# Patient Record
Sex: FEMALE | Employment: FULL TIME | URBAN - METROPOLITAN AREA
[De-identification: names, ages, dates, MRNs, and addresses within clinical notes are randomized per-mention and may not be internally consistent; named-entity substitution may affect disease eponyms.]

---

## 2018-06-12 ENCOUNTER — HOSPITAL ENCOUNTER (OUTPATIENT)
Dept: CT IMAGING | Age: 48
Discharge: HOME | End: 2018-06-12
Attending: FAMILY MEDICINE
Payer: COMMERCIAL

## 2018-06-12 DIAGNOSIS — R59.0 ENLARGED LYMPH NODE IN NECK: ICD-10-CM

## 2018-06-12 PROCEDURE — 74011000258 HC RX REV CODE- 258: Performed by: FAMILY MEDICINE

## 2018-06-12 PROCEDURE — 70491 CT SOFT TISSUE NECK W/DYE: CPT

## 2018-06-12 PROCEDURE — 74011636320 HC RX REV CODE- 636/320: Performed by: FAMILY MEDICINE

## 2018-06-12 RX ORDER — SODIUM CHLORIDE 0.9 % (FLUSH) 0.9 %
10 SYRINGE (ML) INJECTION
Status: COMPLETED | OUTPATIENT
Start: 2018-06-12 | End: 2018-06-12

## 2018-06-12 RX ADMIN — SODIUM CHLORIDE 100 ML: 900 INJECTION, SOLUTION INTRAVENOUS at 08:05

## 2018-06-12 RX ADMIN — Medication 10 ML: at 08:05

## 2018-06-12 RX ADMIN — IOPAMIDOL: 755 INJECTION, SOLUTION INTRAVENOUS at 08:05

## 2018-06-12 NOTE — PROGRESS NOTES
present for CT scan with contrast        Hermilo [email or web address]  c: [address]  [facility] / [place], [address] [facility]  [email or web address]

## 2018-06-14 NOTE — PROGRESS NOTES
Please let patient know her CT neck is normal. IMPRESSION:  No acute findings in the neck    Thanks,  Dr. Ayleen Olvera

## 2018-10-19 ENCOUNTER — HOSPITAL ENCOUNTER (OUTPATIENT)
Dept: MAMMOGRAPHY | Age: 48
Discharge: HOME | End: 2018-10-19
Attending: FAMILY MEDICINE
Payer: COMMERCIAL

## 2018-10-19 DIAGNOSIS — Z12.31 SCREENING MAMMOGRAM, ENCOUNTER FOR: ICD-10-CM

## 2018-10-19 PROCEDURE — 77067 SCR MAMMO BI INCL CAD: CPT

## 2019-03-12 ENCOUNTER — HOSPITAL ENCOUNTER (OUTPATIENT)
Dept: MAMMOGRAPHY | Age: 49
Discharge: HOME | End: 2019-03-12
Attending: FAMILY MEDICINE
Payer: COMMERCIAL

## 2019-03-12 DIAGNOSIS — N64.4 BREAST PAIN: ICD-10-CM

## 2019-03-12 PROCEDURE — 77066 DX MAMMO INCL CAD BI: CPT

## 2019-03-12 NOTE — PROGRESS NOTES
Dear Mrs. Jeffers Redo     Your recent mammogram showed :No mammographic evidence of malignancy.      Recommend annual mammogram in one year.  A reminder letter will be scheduled.     Thanks,  Dr. James Hallmark

## 2020-03-17 PROBLEM — E78.1 HYPERTRIGLYCERIDEMIA: Status: ACTIVE | Noted: 2020-03-17

## 2020-06-09 ENCOUNTER — HOSPITAL ENCOUNTER (OUTPATIENT)
Dept: MAMMOGRAPHY | Age: 50
Discharge: HOME | End: 2020-06-09
Attending: FAMILY MEDICINE
Payer: COMMERCIAL

## 2020-06-09 DIAGNOSIS — Z12.39 BREAST CANCER SCREENING: ICD-10-CM

## 2020-06-09 PROCEDURE — 77067 SCR MAMMO BI INCL CAD: CPT

## 2020-06-21 NOTE — PROGRESS NOTES
Dear Doni Koffi Jameson     Your recent mammogram showed :No mammographic evidence of malignancy.      Recommend annual mammogram in one year.  A reminder letter will be scheduled.     Thanks,  Dr. Thais Du

## 2021-08-04 ENCOUNTER — HOSPITAL ENCOUNTER (OUTPATIENT)
Dept: LAB | Age: 51
Discharge: HOME | End: 2021-08-04
Payer: COMMERCIAL

## 2021-08-04 DIAGNOSIS — D47.2 MGUS (MONOCLONAL GAMMOPATHY OF UNKNOWN SIGNIFICANCE): ICD-10-CM

## 2021-08-04 LAB
ALBUMIN SERPL-MCNC: 4.2 G/DL (ref 3.5–5)
ALBUMIN/GLOB SERPL: 1 {RATIO} (ref 1.2–3.5)
ALP SERPL-CCNC: 69 U/L (ref 50–136)
ALT SERPL-CCNC: 79 U/L (ref 12–65)
ANION GAP SERPL CALC-SCNC: 3 MMOL/L (ref 7–16)
AST SERPL-CCNC: 32 U/L (ref 15–37)
BASOPHILS # BLD: 0 K/UL (ref 0–0.2)
BASOPHILS NFR BLD: 0 % (ref 0–2)
BILIRUB SERPL-MCNC: 0.5 MG/DL (ref 0.2–1.1)
BUN SERPL-MCNC: 15 MG/DL (ref 6–23)
CALCIUM SERPL-MCNC: 9.2 MG/DL (ref 8.3–10.4)
CHLORIDE SERPL-SCNC: 104 MMOL/L (ref 98–107)
CO2 SERPL-SCNC: 30 MMOL/L (ref 21–32)
CREAT SERPL-MCNC: 0.8 MG/DL (ref 0.6–1)
CRP SERPL-MCNC: <0.3 MG/DL (ref 0–0.9)
DIFFERENTIAL METHOD BLD: NORMAL
EOSINOPHIL # BLD: 0.2 K/UL (ref 0–0.8)
EOSINOPHIL NFR BLD: 3 % (ref 0.5–7.8)
ERYTHROCYTE [DISTWIDTH] IN BLOOD BY AUTOMATED COUNT: 12.6 % (ref 11.9–14.6)
ERYTHROCYTE [SEDIMENTATION RATE] IN BLOOD: 10 MM/HR (ref 0–30)
GLOBULIN SER CALC-MCNC: 4.3 G/DL (ref 2.3–3.5)
GLUCOSE SERPL-MCNC: 95 MG/DL (ref 65–100)
HCT VFR BLD AUTO: 44.5 % (ref 35.8–46.3)
HGB BLD-MCNC: 14.8 G/DL (ref 11.7–15.4)
IMM GRANULOCYTES # BLD AUTO: 0 K/UL (ref 0–0.5)
IMM GRANULOCYTES NFR BLD AUTO: 0 % (ref 0–5)
LYMPHOCYTES # BLD: 2.2 K/UL (ref 0.5–4.6)
LYMPHOCYTES NFR BLD: 42 % (ref 13–44)
MCH RBC QN AUTO: 30.6 PG (ref 26.1–32.9)
MCHC RBC AUTO-ENTMCNC: 33.3 G/DL (ref 31.4–35)
MCV RBC AUTO: 92.1 FL (ref 79.6–97.8)
MONOCYTES # BLD: 0.5 K/UL (ref 0.1–1.3)
MONOCYTES NFR BLD: 9 % (ref 4–12)
NEUTS SEG # BLD: 2.4 K/UL (ref 1.7–8.2)
NEUTS SEG NFR BLD: 45 % (ref 43–78)
NRBC # BLD: 0 K/UL (ref 0–0.2)
PLATELET # BLD AUTO: 198 K/UL (ref 150–450)
PMV BLD AUTO: 9.5 FL (ref 9.4–12.3)
POTASSIUM SERPL-SCNC: 4.1 MMOL/L (ref 3.5–5.1)
PROT SERPL-MCNC: 8.5 G/DL (ref 6.3–8.2)
RBC # BLD AUTO: 4.83 M/UL (ref 4.05–5.2)
SODIUM SERPL-SCNC: 137 MMOL/L (ref 136–145)
WBC # BLD AUTO: 5.3 K/UL (ref 4.3–11.1)

## 2021-08-04 PROCEDURE — 85652 RBC SED RATE AUTOMATED: CPT

## 2021-08-04 PROCEDURE — 85025 COMPLETE CBC W/AUTO DIFF WBC: CPT

## 2021-08-04 PROCEDURE — 86140 C-REACTIVE PROTEIN: CPT

## 2021-08-04 PROCEDURE — 83883 ASSAY NEPHELOMETRY NOT SPEC: CPT

## 2021-08-04 PROCEDURE — 36415 COLL VENOUS BLD VENIPUNCTURE: CPT

## 2021-08-04 PROCEDURE — 82784 ASSAY IGA/IGD/IGG/IGM EACH: CPT

## 2021-08-04 PROCEDURE — 80053 COMPREHEN METABOLIC PANEL: CPT

## 2021-08-04 PROCEDURE — 86334 IMMUNOFIX E-PHORESIS SERUM: CPT

## 2021-08-05 LAB
KAPPA LC FREE SER-MCNC: 21.95 MG/L (ref 3.3–19.4)
KAPPA LC FREE/LAMBDA FREE SER: 1.74 {RATIO} (ref 0.26–1.65)
LAMBDA LC FREE SERPL-MCNC: 12.63 MG/L (ref 5.71–26.3)

## 2021-08-06 ENCOUNTER — HOSPITAL ENCOUNTER (OUTPATIENT)
Dept: LAB | Age: 51
Discharge: HOME | End: 2021-08-06
Payer: COMMERCIAL

## 2021-08-06 DIAGNOSIS — D47.2 MGUS (MONOCLONAL GAMMOPATHY OF UNKNOWN SIGNIFICANCE): ICD-10-CM

## 2021-08-06 LAB
ALBUMIN SERPL ELPH-MCNC: 4.23 G/DL (ref 3.2–5.6)
ALBUMIN/GLOB SERPL: 1.1 {RATIO}
ALPHA1 GLOB SERPL ELPH-MCNC: 0.21 G/DL (ref 0.1–0.4)
ALPHA2 GLOB SERPL ELPH-MCNC: 0.69 G/DL (ref 0.4–1.2)
B-GLOBULIN SERPL QL ELPH: 1.23 G/DL (ref 0.6–1.3)
GAMMA GLOB MFR SERPL ELPH: 1.73 G/DL (ref 0.5–1.6)
IGA SERPL-MCNC: 484 MG/DL (ref 85–499)
IGG SERPL-MCNC: 1462 MG/DL (ref 610–1616)
IGM SERPL-MCNC: 109 MG/DL (ref 35–242)
M PROTEIN SERPL ELPH-MCNC: ABNORMAL G/DL
PROT PATTERN SERPL ELPH-IMP: ABNORMAL
PROT PATTERN SPEC IFE-IMP: ABNORMAL
PROT SERPL-MCNC: 8.1 G/DL (ref 6.3–8.2)

## 2021-08-06 PROCEDURE — 86335 IMMUNFIX E-PHORSIS/URINE/CSF: CPT

## 2021-08-06 PROCEDURE — 84166 PROTEIN E-PHORESIS/URINE/CSF: CPT

## 2021-08-10 LAB
ALBUMIN UR ELPH-MCNC: <2 MG/DL
ALPHA1 GLOB 24H UR ELPH-MCNC: 0.1 MG/DL
ALPHA2 GLOB SERPL ELPH-MCNC: <0.4 MG/DL
B-GLOBULIN UR QL ELPH: <1.3 MG/DL
COLLECT DURATION TIME UR: 24 HR
GAMMA GLOB MFR UR ELPH: <1.2 MG/DL
M PROTEIN UR-MCNC: NORMAL MG/DL
PROT 24H UR-MRATE: NORMAL MG/24HR
PROT PATTERN SPEC IFE-IMP: NORMAL
PROT PATTERN UR ELPH-IMP: NORMAL
PROT UR-MCNC: <5 MG/DL
SPECIMEN VOL ?TM UR: 2075 ML

## 2022-10-19 ENCOUNTER — OFFICE VISIT (OUTPATIENT)
Dept: OBGYN CLINIC | Age: 52
End: 2022-10-19
Payer: COMMERCIAL

## 2022-10-19 VITALS
DIASTOLIC BLOOD PRESSURE: 68 MMHG | WEIGHT: 166 LBS | HEIGHT: 62 IN | SYSTOLIC BLOOD PRESSURE: 104 MMHG | BODY MASS INDEX: 30.55 KG/M2

## 2022-10-19 DIAGNOSIS — Z13.89 SCREENING FOR GENITOURINARY CONDITION: ICD-10-CM

## 2022-10-19 DIAGNOSIS — Z12.4 PAP SMEAR FOR CERVICAL CANCER SCREENING: ICD-10-CM

## 2022-10-19 DIAGNOSIS — Z01.419 WELL WOMAN EXAM: Primary | ICD-10-CM

## 2022-10-19 DIAGNOSIS — Z11.51 SCREENING FOR HUMAN PAPILLOMAVIRUS (HPV): ICD-10-CM

## 2022-10-19 LAB
BILIRUBIN, URINE, POC: NEGATIVE
BLOOD URINE, POC: NEGATIVE
GLUCOSE URINE, POC: NEGATIVE
KETONES, URINE, POC: NEGATIVE
LEUKOCYTE ESTERASE, URINE, POC: NEGATIVE
NITRITE, URINE, POC: NEGATIVE
PH, URINE, POC: 5.5 (ref 4.6–8)
PROTEIN,URINE, POC: NEGATIVE
SPECIFIC GRAVITY, URINE, POC: 1.02 (ref 1–1.03)
URINALYSIS CLARITY, POC: CLEAR
URINALYSIS COLOR, POC: YELLOW
UROBILINOGEN, POC: NORMAL

## 2022-10-19 PROCEDURE — 99396 PREV VISIT EST AGE 40-64: CPT | Performed by: OBSTETRICS & GYNECOLOGY

## 2022-10-19 PROCEDURE — 81002 URINALYSIS NONAUTO W/O SCOPE: CPT | Performed by: OBSTETRICS & GYNECOLOGY

## 2022-10-19 NOTE — PROGRESS NOTES
HPI: Ms. Ildefonso Biswas is a 45 y.o.   OB History          3    Para        Term   3            AB        Living   3         SAB        IAB        Ectopic        Molar        Multiple        Live Births                 who is here today for a well woman exam. C/o vague lower abdominal pain. No constipation or diarrhea, no fever/chills  No menses since February. Date Performed Result   PAP 20 Negative HR HPV negative   Mammogram 22 Benign   Colonoscopy 11/3/21 Polyps   Dexa NA      GYN History         Patient's last menstrual period was 2022 (approximate). Past Medical History:  Past Medical History:   Diagnosis Date    Adverse effect of anesthesia     hypotension after cosmetic surgery due to length of surgery- lipo, abdominoplasty and breast augmentation- [place]    Alopecia     Gastroesophageal reflux disease without esophagitis 2016    GERD (gastroesophageal reflux disease)     well controlled with omeprazole    Hyperlipidemia LDL goal <100 2016    Hypertriglyceridemia 3/17/2020    Hypothyroid     levothothyroxen     Irritable bowel syndrome without diarrhea 2016    Slow transit constipation 2016    Vitamin D deficiency 2016       Past Surgical History:  Past Surgical History:   Procedure Laterality Date    ABDOMINOPLASTY  2014    BREAST SURGERY  ; 2016    HEENT Left     eye surgery     IMPLANT BREAST SILICONE/EQ Bilateral 2730    LIPOSUCTION      ORTHOPEDIC SURGERY  10/21/2016    left knee       Allergies:    Allergies   Allergen Reactions    Sulfa Antibiotics Hives       Medication History:  Current Outpatient Medications   Medication Sig Dispense Refill    atorvastatin (LIPITOR) 10 MG tablet Take 10 mg by mouth daily      ergocalciferol (ERGOCALCIFEROL) 1.25 MG (78595 UT) capsule Take 50,000 Units by mouth every 7 days      ferrous sulfate (IRON 325) 325 (65 Fe) MG tablet TAKE 1 TABLET BY MOUTH THREE TIMES A DAY levocetirizine (XYZAL) 5 MG tablet Take 5 mg by mouth daily      levothyroxine (SYNTHROID) 25 MCG tablet Takes 1 1/2 PO Daily= 37.5 mcg Indications: a condition with low thyroid hormone levels      linaclotide (LINZESS) 290 MCG CAPS capsule Take 290 mcg by mouth daily      meclizine (ANTIVERT) 12.5 MG tablet Take 12.5 mg by mouth 3 times daily as needed      omeprazole (PRILOSEC) 40 MG delayed release capsule Take 40 mg by mouth daily       No current facility-administered medications for this visit. Social History:  Social History     Socioeconomic History    Marital status:      Spouse name: Not on file    Number of children: Not on file    Years of education: Not on file    Highest education level: Not on file   Occupational History    Not on file   Tobacco Use    Smoking status: Former     Types: Cigarettes     Quit date: 1998     Years since quittin.8    Smokeless tobacco: Never   Substance and Sexual Activity    Alcohol use: No    Drug use: No    Sexual activity: Not on file   Other Topics Concern    Not on file   Social History Narrative    Not on file     Social Determinants of Health     Financial Resource Strain: Not on file   Food Insecurity: Not on file   Transportation Needs: Not on file   Physical Activity: Not on file   Stress: Not on file   Social Connections: Not on file   Intimate Partner Violence: Not on file   Housing Stability: Not on file       Family History:  Family History   Problem Relation Age of Onset    Cancer Mother         pancreas and liver    Asthma Father     Breast Cancer Neg Hx     Hypertension Mother        Review of Systems - General ROS: negative except for that discussed in HPI      ROS:  Feeling well. No dyspnea or chest pain on exertion. No abdominal pain, change in bowel habits, black or bloody stools. No urinary tract symptoms. No neurological complaints.     Objective:   /68   Ht 5' 2\" (1.575 m)   Wt 166 lb (75.3 kg)   LMP 2022 (Approximate)   BMI 30.36 kg/m²     Results for orders placed or performed in visit on 10/19/22   AMB POC URINALYSIS DIP STICK MANUAL W/O MICRO   Result Value Ref Range    Color (UA POC) Yellow     Clarity (UA POC) Clear     Glucose, Urine, POC Negative Negative    Bilirubin, Urine, POC Negative Negative    Ketones, Urine, POC Negative Negative    Specific Gravity, Urine, POC 1.020 1.001 - 1.035    Blood (UA POC) Negative Negative    pH, Urine, POC 5.5 4.6 - 8.0    Protein, Urine, POC Negative Negative    Urobilinogen, POC 0.2 mg/dL     Nitrite, Urine, POC Negative Negative    Leukocyte Esterase, Urine, POC Negative Negative        The patient appears well, alert, oriented x 3, in no distress. ENT normal.  Neck supple. No adenopathy or thyromegaly. Lungs:  clear, good air entry, no wheezes, rhonchi or rales. Heart:  S1 and S2 normal, no murmurs, regular rate and rhythm. Abdomen:  soft without tenderness, guarding, mass or organomegaly. Extremities show no edema, normal peripheral pulses. Neurological is normal, no focal findings. BREAST EXAM: breasts appear normal, no suspicious masses, no skin or nipple changes or axillary nodes, symmetric fibrous changes bilaterally    PELVIC EXAM: External genitalia is within normal limits, urethra, urethra meatus and bladder are midline well supported. Vagina is well rugated, Cervix comes into full view and is within normal limits. Pap done Uterus is 6  week size, no ovarian masses palpated    Assessment/Plan:      Diagnosis Orders   1. Well woman exam  AMB POC URINALYSIS DIP STICK MANUAL W/O MICRO    PAP IG, Aptima HPV and rfx 16/18,45 ([id number])    PAP IG, Aptima HPV and rfx 16/18,45 ([id number])      2. Screening for genitourinary condition  AMB POC URINALYSIS DIP STICK MANUAL W/O MICRO      3. Pap smear for cervical cancer screening  PAP IG, Aptima HPV and rfx 16/18,45 ([id number])    PAP IG, Aptima HPV and rfx 16/18,45 ([id number])      4.  Screening for human papillomavirus (HPV)  PAP IG, Aptima HPV and rfx 16/18,45 ([id number])    PAP IG, Aptima HPV and rfx 16/18,45 ([id number])        Encounter Diagnoses   Name Primary? Well woman exam Yes    Screening for genitourinary condition     Pap smear for cervical cancer screening     Screening for human papillomavirus (HPV)      Orders Placed This Encounter   Procedures    PAP IG, Aptima HPV and rfx 16/18,45 ([id number])     Standing Status:   Future     Number of Occurrences:   1     Standing Expiration Date:   10/19/2023     Order Specific Question:   Pap Source? (Required)     Answer:   cervical     Order Specific Question:   Pap Source? (Required)     Answer:   endocervical     Order Specific Question:   Pap Source? (Required)     Answer:   endometrial     Order Specific Question:   Pap collection method? (Required     Answer:   brush     Order Specific Question:   Pap collection method? (Required     Answer:   broom     Order Specific Question:   Menstrual Status ? Answer:   Postmenopausal [2]     Order Specific Question:   Previous Treatment? (Required)     Answer:   NONE    AMB POC URINALYSIS DIP STICK MANUAL W/O MICRO     Chidi Emperor was seen today for annual exam.    Diagnoses and all orders for this visit:    Well woman exam  -     AMB POC URINALYSIS DIP STICK MANUAL W/O MICRO  -     PAP IG, Aptima HPV and rfx 16/18,45 ([id number]); Future  -     PAP IG, Aptima HPV and rfx 16/18,45 ([id number])    Screening for genitourinary condition  -     AMB POC URINALYSIS DIP STICK MANUAL W/O MICRO    Pap smear for cervical cancer screening  -     PAP IG, Aptima HPV and rfx 16/18,45 ([id number]); Future  -     PAP IG, Aptima HPV and rfx 16/18,45 ([id number])    Screening for human papillomavirus (HPV)  -     PAP IG, Aptima HPV and rfx 16/18,45 ([id number]); Future  -     PAP IG, Aptima HPV and rfx 16/18,45 ([id number])      Return in about 2 weeks (around 11/2/2022) for yearly physical, gyn U/S, recheck.   current treatment plan is effective, no change in therapy   present on-line entire time of exam and discussion; all ?s answered  Mammogram current  pap smear done  return 2 weeks for pelvic U/S

## 2022-10-26 LAB
CYTOLOGIST CVX/VAG CYTO: ABNORMAL
CYTOLOGY CVX/VAG DOC THIN PREP: ABNORMAL
HPV APTIMA: NEGATIVE
Lab: ABNORMAL
PATH REPORT.FINAL DX SPEC: ABNORMAL
PATHOLOGIST CVX/VAG CYTO: ABNORMAL
PATHOLOGIST PROVIDED ICD: ABNORMAL
RECOM F/U CVX/VAG CYTO: ABNORMAL
STAT OF ADQ CVX/VAG CYTO-IMP: ABNORMAL

## 2022-11-04 ENCOUNTER — TELEPHONE (OUTPATIENT)
Dept: OBGYN CLINIC | Age: 52
End: 2022-11-04

## 2022-11-04 NOTE — TELEPHONE ENCOUNTER
----- Message from Bianca Perdomo MD sent at 11/2/2022  4:10 PM EDT -----  Regarding: pap  ASCUS pos, but HPV negative; so repeat 1 year  ----- Message -----  From: Nickie Martins Incoming [place] [facility]  Sent: 10/26/2022  11:38 AM EDT  To: Bianca Perdomo MD

## 2022-11-10 ENCOUNTER — TELEPHONE (OUTPATIENT)
Dept: OBGYN CLINIC | Age: 52
End: 2022-11-10

## 2022-11-10 ENCOUNTER — OFFICE VISIT (OUTPATIENT)
Dept: OBGYN CLINIC | Age: 52
End: 2022-11-10
Payer: COMMERCIAL

## 2022-11-10 VITALS — BODY MASS INDEX: 30.36 KG/M2 | WEIGHT: 166 LBS

## 2022-11-10 DIAGNOSIS — R10.2 PELVIC PAIN IN FEMALE: Primary | ICD-10-CM

## 2022-11-10 PROCEDURE — 99214 OFFICE O/P EST MOD 30 MIN: CPT | Performed by: OBSTETRICS & GYNECOLOGY

## 2022-11-10 PROCEDURE — 76830 TRANSVAGINAL US NON-OB: CPT | Performed by: OBSTETRICS & GYNECOLOGY

## 2022-11-10 NOTE — PROGRESS NOTES
The patient is a 45 y.o. . who is seen for follow-up from 10/19/2022 -- uterus is 6 weeks in size and not period since February. C/o menopause sxs  Ultrasound findings from today (11/10/2022):     on-line used entirety of talk  HISTORY:  No LMP recorded. Sexual History:  has sex with males  Contraception:  menopause  Current Outpatient Medications on File Prior to Visit   Medication Sig Dispense Refill    atorvastatin (LIPITOR) 10 MG tablet Take 10 mg by mouth daily      ergocalciferol (ERGOCALCIFEROL) 1.25 MG (54839 UT) capsule Take 50,000 Units by mouth every 7 days      ferrous sulfate (IRON 325) 325 (65 Fe) MG tablet TAKE 1 TABLET BY MOUTH THREE TIMES A DAY      levocetirizine (XYZAL) 5 MG tablet Take 5 mg by mouth daily      levothyroxine (SYNTHROID) 25 MCG tablet Takes 1 1/2 PO Daily= 37.5 mcg Indications: a condition with low thyroid hormone levels      linaclotide (LINZESS) 290 MCG CAPS capsule Take 290 mcg by mouth daily      meclizine (ANTIVERT) 12.5 MG tablet Take 12.5 mg by mouth 3 times daily as needed      omeprazole (PRILOSEC) 40 MG delayed release capsule Take 40 mg by mouth daily       No current facility-administered medications on file prior to visit. ROS:  Feeling well. No dyspnea or chest pain on exertion. No abdominal pain, change in bowel habits, black or bloody stools. No urinary tract symptoms. GYN ROS: no breast pain or new or enlarging lumps on self exam.    PHYSICAL EXAM:  There were no vitals taken for this visit. The patient appears well, alert, oriented x 3, in no distress. ASSESSMENT:    U/S wnl, uterus 57cc's. Ovaries wnl. just 3 very small incidental fibroids; discussed this is not causing the pelvic discomfort. Seems a little better so will observe. She wishes to start HRT pellets; discussed nature of these. Wants to order today. E2 25mg/[id number].      PLAN:  All questions answered  Diagnosis explained in detail, including differential  Follow up in 2 weeks for pellets  Pelvic ultrasound

## 2022-11-21 NOTE — PROGRESS NOTES
Alison Carrillo 45 y.o. is here for her first hormone pellet placement. Windell [employer] is aware that hormone pellets are not FDA approved. Patient has no current complaints. Her current ordered dosage, per Dr. Negro Willis, is Estradiol 25mg and Testosterone 75mg which she has brought with her to the office today for placement. Estradiol 25mg    Lot # [id number]                    Expiration: 4/4/23    Testosterone  75mg      Lot # [id number]             Expiration: 5/6/23     Consent was obtained. Left buttocks was prepped with betadine and injected with 2% lidocaine with epinephrine. Once satisfactorily numb, small puncture site was made with #11 blade in sterile fashion. Sterile trochar was placed and hormone pellets were inserted. Trochar was removed. Scant bleeding noted. Steri strips were applied. Care for the area was discussed. Patient tolerated the procedure well.

## 2022-11-22 ENCOUNTER — PROCEDURE VISIT (OUTPATIENT)
Dept: OBGYN CLINIC | Age: 52
End: 2022-11-22
Payer: COMMERCIAL

## 2022-11-22 VITALS — WEIGHT: 167.4 LBS | BODY MASS INDEX: 30.62 KG/M2

## 2022-11-22 DIAGNOSIS — Z79.890 HORMONE REPLACEMENT THERAPY: Primary | ICD-10-CM

## 2022-11-22 PROCEDURE — 11980 IMPLANT HORMONE PELLET(S): CPT | Performed by: OBSTETRICS & GYNECOLOGY

## 2023-02-02 ENCOUNTER — TELEPHONE (OUTPATIENT)
Dept: OBGYN CLINIC | Age: 53
End: 2023-02-02

## 2023-02-02 ENCOUNTER — OFFICE VISIT (OUTPATIENT)
Dept: OBGYN CLINIC | Age: 53
End: 2023-02-02
Payer: COMMERCIAL

## 2023-02-02 VITALS — WEIGHT: 169.2 LBS | HEIGHT: 62 IN | BODY MASS INDEX: 31.14 KG/M2

## 2023-02-02 DIAGNOSIS — N95.0 POST-MENOPAUSAL BLEEDING: Primary | ICD-10-CM

## 2023-02-02 PROCEDURE — 99214 OFFICE O/P EST MOD 30 MIN: CPT | Performed by: OBSTETRICS & GYNECOLOGY

## 2023-02-02 RX ORDER — PROGESTERONE 100 MG/1
100 CAPSULE ORAL NIGHTLY
Qty: 90 CAPSULE | Refills: 4 | Status: SHIPPED | OUTPATIENT
Start: 2023-02-02

## 2023-02-02 NOTE — TELEPHONE ENCOUNTER
Per verbal orders by Dr. German Chavarria. Increased Testosterone pellets from 75mg to 100mg.     New prescription called in to [facility]  Testosterone 100mg pellets x 6 months

## 2023-02-02 NOTE — PROGRESS NOTES
The patient is a 45 y.o. [id number] who is seen for abnormal bleeding. After a year of not having a period she started having vaginal bleeding that started 3 weeks ago and still present. Admits to dark colored, light vaginal bleeding with increased cramping at the beginning but now vaginal bleeding is bright red, heavier, changing a pad 3x daily. Pt had hormone pellets placed 11/22/22. Pt would like to discuss starting Prometrium and gyn ultrasound. HISTORY:    [id number]  Patient's last menstrual period was 01/12/2023 (approximate). Current Outpatient Medications on File Prior to Visit   Medication Sig Dispense Refill    atorvastatin (LIPITOR) 10 MG tablet Take 10 mg by mouth daily      ergocalciferol (ERGOCALCIFEROL) 1.25 MG (24642 UT) capsule Take 50,000 Units by mouth every 7 days      ferrous sulfate (IRON 325) 325 (65 Fe) MG tablet TAKE 1 TABLET BY MOUTH THREE TIMES A DAY      levocetirizine (XYZAL) 5 MG tablet Take 5 mg by mouth daily      levothyroxine (SYNTHROID) 25 MCG tablet Takes 1 1/2 PO Daily= 37.5 mcg Indications: a condition with low thyroid hormone levels      linaclotide (LINZESS) 290 MCG CAPS capsule Take 290 mcg by mouth daily      meclizine (ANTIVERT) 12.5 MG tablet Take 12.5 mg by mouth 3 times daily as needed      omeprazole (PRILOSEC) 40 MG delayed release capsule Take 40 mg by mouth daily       No current facility-administered medications on file prior to visit. ROS:  Feeling well. No dyspnea or chest pain on exertion. No abdominal pain, change in bowel habits, black or bloody stools. No urinary tract symptoms. GYN ROS: no breast pain or new or enlarging lumps on self exam.    PHYSICAL EXAM:  Height 5' 2\" (1.575 m), weight 169 lb 3.2 oz (76.7 kg), last menstrual period 01/12/2023. The patient appears well, alert, oriented x 3, in no distress.       ASSESSMENT:    PMB precipitated by starting of HRT , should improve with adding prometrium    PLAN:  All questions answered  Diagnosis explained in detail, including differential  Follow up in 1month for next pellet insertion; will increase testosterone to 100mg d/t not enuff energy; E2 is ok  Pelvic ultrasound reviewed from before  Will start prometrium 100mg qhs, Rx sent

## 2023-02-13 ENCOUNTER — OFFICE VISIT (OUTPATIENT)
Dept: OBGYN CLINIC | Age: 53
End: 2023-02-13
Payer: COMMERCIAL

## 2023-02-13 VITALS — BODY MASS INDEX: 30.51 KG/M2 | HEIGHT: 62 IN | WEIGHT: 165.8 LBS

## 2023-02-13 DIAGNOSIS — N95.0 POST-MENOPAUSAL BLEEDING: Primary | ICD-10-CM

## 2023-02-13 PROCEDURE — 76830 TRANSVAGINAL US NON-OB: CPT | Performed by: OBSTETRICS & GYNECOLOGY

## 2023-02-13 PROCEDURE — 99214 OFFICE O/P EST MOD 30 MIN: CPT | Performed by: OBSTETRICS & GYNECOLOGY

## 2023-02-13 RX ORDER — PROGESTERONE 200 MG/1
200 CAPSULE ORAL NIGHTLY
Qty: 30 CAPSULE | Refills: 12 | Status: SHIPPED | OUTPATIENT
Start: 2023-02-13

## 2023-02-13 NOTE — PROGRESS NOTES
The patient is a 45 y.o. [id number] who is seen for heavy vaginal bleeding with passage of blood clots. Pt was seen 2/2/23 with complaints of abnormal vaginal bleeding that started about 3 weeks prior to appointment. Last hormone pellets were placed 11/22/22 and pt is due to come back 3/8/23 to placement of Testosterone 100mg and Estradiol 25mg. Dosage on Testosterone was increased from 75mg to 100mg at her last appointment. Pt was also advised to start taking Prometrium 100mg qhs and prescription was sent to pharmacy. Ultrasound Findings Today:  Cx appears WNL  Uterus is anteverted and heterogenous, c/w diffuse adenomyosis. Fibroid noted anterior to endo causing distortion. Measures 2.5 x 1.8 x 2.2 cm  Endo = 2.9 mm, no intracavitary masses visualized  ROV is visualized with follicles and simple cyst measuring 1.8 x 0.9 x 1.4 cm  LOV is visualized with follicles and WNL  Bilateral adnexa appear WNL    HISTORY:    [id number]  No LMP recorded. Current Outpatient Medications on File Prior to Visit   Medication Sig Dispense Refill    progesterone (PROMETRIUM) 100 MG CAPS capsule Take 1 capsule by mouth nightly 90 capsule 4    atorvastatin (LIPITOR) 10 MG tablet Take 10 mg by mouth daily      ergocalciferol (ERGOCALCIFEROL) 1.25 MG (81455 UT) capsule Take 50,000 Units by mouth every 7 days      ferrous sulfate (IRON 325) 325 (65 Fe) MG tablet TAKE 1 TABLET BY MOUTH THREE TIMES A DAY      levocetirizine (XYZAL) 5 MG tablet Take 5 mg by mouth daily      levothyroxine (SYNTHROID) 25 MCG tablet Takes 1 1/2 PO Daily= 37.5 mcg Indications: a condition with low thyroid hormone levels      linaclotide (LINZESS) 290 MCG CAPS capsule Take 290 mcg by mouth daily      meclizine (ANTIVERT) 12.5 MG tablet Take 12.5 mg by mouth 3 times daily as needed      omeprazole (PRILOSEC) 40 MG delayed release capsule Take 40 mg by mouth daily       No current facility-administered medications on file prior to visit. ROS:  Feeling well. No dyspnea or chest pain on exertion. No abdominal pain, change in bowel habits, black or bloody stools. No urinary tract symptoms. GYN ROS: she complains of PMB. PHYSICAL EXAM:  There were no vitals taken for this visit. The patient appears well, alert, oriented x 3, in no distress.       ASSESSMENT:    PMB d/t endometrial fibroid - see U/S report   Increase Prometrium to 200mg qhs until surgery  PLAN:  All questions answered  Diagnosis explained in detail, including differential  Follow-up as needed for preop  Pelvic ultrasound reviewed   Schedule for surgery - D and C hysteroscopy and myosure removal of fibroid   on line for entire visit